# Patient Record
Sex: FEMALE | Race: BLACK OR AFRICAN AMERICAN | Employment: UNEMPLOYED | URBAN - METROPOLITAN AREA
[De-identification: names, ages, dates, MRNs, and addresses within clinical notes are randomized per-mention and may not be internally consistent; named-entity substitution may affect disease eponyms.]

---

## 2019-06-11 PROBLEM — N76.0 BV (BACTERIAL VAGINOSIS): Status: ACTIVE | Noted: 2019-06-11

## 2019-06-11 PROBLEM — B96.89 BV (BACTERIAL VAGINOSIS): Status: ACTIVE | Noted: 2019-06-11

## 2019-06-11 PROBLEM — E10.65 HYPERGLYCEMIA DUE TO TYPE 1 DIABETES MELLITUS (HCC): Status: ACTIVE | Noted: 2019-06-11

## 2019-06-11 PROBLEM — N94.6 SEVERE DYSMENORRHEA: Status: ACTIVE | Noted: 2019-06-11

## 2019-09-18 VITALS — BODY MASS INDEX: 20.49 KG/M2 | WEIGHT: 120 LBS | HEIGHT: 64 IN

## 2019-09-18 RX ORDER — INSULIN GLARGINE 100 [IU]/ML
25 INJECTION, SOLUTION SUBCUTANEOUS
COMMUNITY
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
## 2019-09-19 ENCOUNTER — HOSPITAL ENCOUNTER (OUTPATIENT)
Dept: SURGERY | Age: 39
Discharge: HOME | End: 2019-09-19

## 2019-09-20 ENCOUNTER — HOSPITAL ENCOUNTER (OUTPATIENT)
Dept: SURGERY | Age: 39
Discharge: HOME | End: 2019-09-20
Payer: COMMERCIAL

## 2019-09-20 LAB
ATRIAL RATE: 86 BPM
CALCULATED P AXIS, ECG09: 34 DEGREES
CALCULATED R AXIS, ECG10: 32 DEGREES
CALCULATED T AXIS, ECG11: 36 DEGREES
DIAGNOSIS, 93000: NORMAL
HGB BLD-MCNC: 12.6 G/DL (ref 11.7–15.4)
P-R INTERVAL, ECG05: 120 MS
Q-T INTERVAL, ECG07: 366 MS
QRS DURATION, ECG06: 78 MS
QTC CALCULATION (BEZET), ECG08: 437 MS
VENTRICULAR RATE, ECG03: 86 BPM

## 2019-09-20 PROCEDURE — 85018 HEMOGLOBIN: CPT

## 2019-09-20 PROCEDURE — 93005 ELECTROCARDIOGRAM TRACING: CPT | Performed by: ANESTHESIOLOGY

## 2019-09-20 NOTE — PERIOP NOTES
The below lab results are within anesthesia limits, no further action is required.      Recent Results (from the past 12 hour(s))   EKG, 12 LEAD, INITIAL    Collection Time: 09/20/19  8:42 AM   Result Value Ref Range    Ventricular Rate 86 BPM    Atrial Rate 86 BPM    P-R Interval 120 ms    QRS Duration 78 ms    Q-T Interval 366 ms    QTC Calculation (Bezet) 437 ms    Calculated P Axis 34 degrees    Calculated R Axis 32 degrees    Calculated T Axis 36 degrees    Diagnosis       Normal sinus rhythm  Normal ECG  No previous ECGs available     HEMOGLOBIN    Collection Time: 09/20/19  9:36 AM   Result Value Ref Range    HGB 12.6 11.7 - 15.4 g/dL

## 2019-09-20 NOTE — PERIOP NOTES
Patient arrived to have HGB and EKG completed. EKG completed; results within anesthesia limits. Tracing placed on chart if needed for anesthesia reference. Hgb results pending.

## 2019-09-23 PROBLEM — N80.03 ADENOMYOSIS: Status: ACTIVE | Noted: 2019-09-23

## 2019-09-23 PROBLEM — N92.0 MENORRHAGIA WITH REGULAR CYCLE: Status: ACTIVE | Noted: 2019-09-23

## 2019-09-23 PROBLEM — D25.1 INTRAMURAL LEIOMYOMA OF UTERUS: Status: ACTIVE | Noted: 2019-09-23

## 2019-09-26 ENCOUNTER — ANESTHESIA EVENT (OUTPATIENT)
Dept: SURGERY | Age: 39
End: 2019-09-26
Payer: COMMERCIAL

## 2019-09-26 ENCOUNTER — ANESTHESIA (OUTPATIENT)
Dept: SURGERY | Age: 39
End: 2019-09-26
Payer: COMMERCIAL

## 2019-09-26 ENCOUNTER — HOSPITAL ENCOUNTER (OUTPATIENT)
Age: 39
Setting detail: OBSERVATION
Discharge: HOME | End: 2019-09-27
Attending: OBSTETRICS & GYNECOLOGY | Admitting: OBSTETRICS & GYNECOLOGY
Payer: COMMERCIAL

## 2019-09-26 DIAGNOSIS — G89.18 POST-OP PAIN: Primary | ICD-10-CM

## 2019-09-26 PROBLEM — E11.9 DIABETES MELLITUS TYPE 2, CONTROLLED (HCC): Status: ACTIVE | Noted: 2019-09-26

## 2019-09-26 PROBLEM — D25.9 UTERINE FIBROID: Status: ACTIVE | Noted: 2019-09-26

## 2019-09-26 PROBLEM — N92.0 MENORRHAGIA: Status: ACTIVE | Noted: 2019-09-26

## 2019-09-26 LAB
ABO + RH BLD: NORMAL
BLOOD GROUP ANTIBODIES SERPL: NORMAL
ERYTHROCYTE [DISTWIDTH] IN BLOOD BY AUTOMATED COUNT: 15.2 % (ref 11.9–14.6)
EST. AVERAGE GLUCOSE BLD GHB EST-MCNC: 272 MG/DL
GLUCOSE BLD STRIP.AUTO-MCNC: 137 MG/DL (ref 65–100)
GLUCOSE BLD STRIP.AUTO-MCNC: 210 MG/DL (ref 65–100)
GLUCOSE BLD STRIP.AUTO-MCNC: 225 MG/DL (ref 65–100)
GLUCOSE BLD STRIP.AUTO-MCNC: 260 MG/DL (ref 65–100)
GLUCOSE BLD STRIP.AUTO-MCNC: 290 MG/DL (ref 65–100)
GLUCOSE BLD STRIP.AUTO-MCNC: 327 MG/DL (ref 65–100)
GLUCOSE BLD STRIP.AUTO-MCNC: 72 MG/DL (ref 65–100)
HBA1C MFR BLD: 11.1 %
HCG UR QL: NEGATIVE
HCT VFR BLD AUTO: 39.4 % (ref 35.8–46.3)
HGB BLD-MCNC: 12.8 G/DL (ref 11.7–15.4)
MCH RBC QN AUTO: 27.9 PG (ref 26.1–32.9)
MCHC RBC AUTO-ENTMCNC: 32.5 G/DL (ref 31.4–35)
MCV RBC AUTO: 86 FL (ref 79.6–97.8)
NRBC # BLD: 0 K/UL (ref 0–0.2)
PLATELET # BLD AUTO: 219 K/UL (ref 150–450)
PMV BLD AUTO: 10.4 FL (ref 9.4–12.3)
RBC # BLD AUTO: 4.58 M/UL (ref 4.05–5.2)
SPECIMEN EXP DATE BLD: NORMAL
WBC # BLD AUTO: 5.9 K/UL (ref 4.3–11.1)

## 2019-09-26 PROCEDURE — 83036 HEMOGLOBIN GLYCOSYLATED A1C: CPT

## 2019-09-26 PROCEDURE — 74011250636 HC RX REV CODE- 250/636

## 2019-09-26 PROCEDURE — 74011636637 HC RX REV CODE- 636/637: Performed by: ANESTHESIOLOGY

## 2019-09-26 PROCEDURE — 77030034849: Performed by: OBSTETRICS & GYNECOLOGY

## 2019-09-26 PROCEDURE — 77030003029 HC SUT VCRL J&J -B: Performed by: OBSTETRICS & GYNECOLOGY

## 2019-09-26 PROCEDURE — 77030002888 HC SUT CHRMC J&J -A: Performed by: OBSTETRICS & GYNECOLOGY

## 2019-09-26 PROCEDURE — 77030022703 HC LIGASURE  BLNT LAPSCP SEAL COVD -E: Performed by: OBSTETRICS & GYNECOLOGY

## 2019-09-26 PROCEDURE — 74011250637 HC RX REV CODE- 250/637: Performed by: OBSTETRICS & GYNECOLOGY

## 2019-09-26 PROCEDURE — 76060000035 HC ANESTHESIA 2 TO 2.5 HR: Performed by: OBSTETRICS & GYNECOLOGY

## 2019-09-26 PROCEDURE — 77030039425 HC BLD LARYNG TRULITE DISP TELE -A: Performed by: ANESTHESIOLOGY

## 2019-09-26 PROCEDURE — 77030031139 HC SUT VCRL2 J&J -A: Performed by: OBSTETRICS & GYNECOLOGY

## 2019-09-26 PROCEDURE — 99218 HC RM OBSERVATION: CPT

## 2019-09-26 PROCEDURE — 85027 COMPLETE CBC AUTOMATED: CPT

## 2019-09-26 PROCEDURE — 74011250637 HC RX REV CODE- 250/637: Performed by: ANESTHESIOLOGY

## 2019-09-26 PROCEDURE — 77030008522 HC TBNG INSUF LAPRO STRY -B: Performed by: OBSTETRICS & GYNECOLOGY

## 2019-09-26 PROCEDURE — 74011250636 HC RX REV CODE- 250/636: Performed by: ANESTHESIOLOGY

## 2019-09-26 PROCEDURE — 74011250636 HC RX REV CODE- 250/636: Performed by: OBSTETRICS & GYNECOLOGY

## 2019-09-26 PROCEDURE — 77030019895 HC PCKNG STRP IODO -A: Performed by: OBSTETRICS & GYNECOLOGY

## 2019-09-26 PROCEDURE — 77030012770 HC TRCR OPT FX AMR -B: Performed by: OBSTETRICS & GYNECOLOGY

## 2019-09-26 PROCEDURE — 76210000006 HC OR PH I REC 0.5 TO 1 HR: Performed by: OBSTETRICS & GYNECOLOGY

## 2019-09-26 PROCEDURE — 82962 GLUCOSE BLOOD TEST: CPT

## 2019-09-26 PROCEDURE — 77030040361 HC SLV COMPR DVT MDII -B: Performed by: OBSTETRICS & GYNECOLOGY

## 2019-09-26 PROCEDURE — 86900 BLOOD TYPING SEROLOGIC ABO: CPT

## 2019-09-26 PROCEDURE — 77030019940 HC BLNKT HYPOTHRM STRY -B: Performed by: ANESTHESIOLOGY

## 2019-09-26 PROCEDURE — 81025 URINE PREGNANCY TEST: CPT

## 2019-09-26 PROCEDURE — 74011000258 HC RX REV CODE- 258: Performed by: OBSTETRICS & GYNECOLOGY

## 2019-09-26 PROCEDURE — 74011636637 HC RX REV CODE- 636/637: Performed by: INTERNAL MEDICINE

## 2019-09-26 PROCEDURE — 77030008756 HC TU IRR SUC STRY -B: Performed by: OBSTETRICS & GYNECOLOGY

## 2019-09-26 PROCEDURE — 77030018836 HC SOL IRR NACL ICUM -A: Performed by: OBSTETRICS & GYNECOLOGY

## 2019-09-26 PROCEDURE — 77030039266 HC ADH SKN EXOFIN S2SG -A: Performed by: OBSTETRICS & GYNECOLOGY

## 2019-09-26 PROCEDURE — 77030037088 HC TUBE ENDOTRACH ORAL NSL COVD-A: Performed by: ANESTHESIOLOGY

## 2019-09-26 PROCEDURE — 77030008606 HC TRCR ENDOSC KII AMR -B: Performed by: OBSTETRICS & GYNECOLOGY

## 2019-09-26 PROCEDURE — 77030020829: Performed by: OBSTETRICS & GYNECOLOGY

## 2019-09-26 PROCEDURE — 74011000250 HC RX REV CODE- 250

## 2019-09-26 PROCEDURE — 76010000171 HC OR TIME 2 TO 2.5 HR INTENSV-TIER 1: Performed by: OBSTETRICS & GYNECOLOGY

## 2019-09-26 PROCEDURE — 77030003578 HC NDL INSUF VERES AMR -B: Performed by: OBSTETRICS & GYNECOLOGY

## 2019-09-26 PROCEDURE — 88307 TISSUE EXAM BY PATHOLOGIST: CPT

## 2019-09-26 RX ORDER — FLUMAZENIL 0.1 MG/ML
0.2 INJECTION INTRAVENOUS AS NEEDED
Status: DISCONTINUED | OUTPATIENT
Start: 2019-09-26 | End: 2019-09-26 | Stop reason: HOSPADM

## 2019-09-26 RX ORDER — SODIUM CHLORIDE, SODIUM LACTATE, POTASSIUM CHLORIDE, CALCIUM CHLORIDE 600; 310; 30; 20 MG/100ML; MG/100ML; MG/100ML; MG/100ML
75 INJECTION, SOLUTION INTRAVENOUS CONTINUOUS
Status: DISCONTINUED | OUTPATIENT
Start: 2019-09-26 | End: 2019-09-26 | Stop reason: HOSPADM

## 2019-09-26 RX ORDER — OXYCODONE AND ACETAMINOPHEN 7.5; 325 MG/1; MG/1
1 TABLET ORAL
Status: DISCONTINUED | OUTPATIENT
Start: 2019-09-26 | End: 2019-09-27 | Stop reason: HOSPADM

## 2019-09-26 RX ORDER — SODIUM CHLORIDE 0.9 % (FLUSH) 0.9 %
5-40 SYRINGE (ML) INJECTION EVERY 8 HOURS
Status: DISCONTINUED | OUTPATIENT
Start: 2019-09-26 | End: 2019-09-27 | Stop reason: HOSPADM

## 2019-09-26 RX ORDER — NEOSTIGMINE METHYLSULFATE 1 MG/ML
INJECTION, SOLUTION INTRAVENOUS AS NEEDED
Status: DISCONTINUED | OUTPATIENT
Start: 2019-09-26 | End: 2019-09-26 | Stop reason: HOSPADM

## 2019-09-26 RX ORDER — INSULIN GLARGINE 100 [IU]/ML
25 INJECTION, SOLUTION SUBCUTANEOUS
Status: DISCONTINUED | OUTPATIENT
Start: 2019-09-26 | End: 2019-09-27 | Stop reason: HOSPADM

## 2019-09-26 RX ORDER — HYDROMORPHONE HYDROCHLORIDE 2 MG/ML
0.5 INJECTION, SOLUTION INTRAMUSCULAR; INTRAVENOUS; SUBCUTANEOUS
Status: COMPLETED | OUTPATIENT
Start: 2019-09-26 | End: 2019-09-26

## 2019-09-26 RX ORDER — FENTANYL CITRATE 50 UG/ML
INJECTION, SOLUTION INTRAMUSCULAR; INTRAVENOUS AS NEEDED
Status: DISCONTINUED | OUTPATIENT
Start: 2019-09-26 | End: 2019-09-26 | Stop reason: HOSPADM

## 2019-09-26 RX ORDER — ROCURONIUM BROMIDE 10 MG/ML
INJECTION, SOLUTION INTRAVENOUS AS NEEDED
Status: DISCONTINUED | OUTPATIENT
Start: 2019-09-26 | End: 2019-09-26 | Stop reason: HOSPADM

## 2019-09-26 RX ORDER — SODIUM CHLORIDE 0.9 % (FLUSH) 0.9 %
5-40 SYRINGE (ML) INJECTION AS NEEDED
Status: DISCONTINUED | OUTPATIENT
Start: 2019-09-26 | End: 2019-09-26 | Stop reason: HOSPADM

## 2019-09-26 RX ORDER — INSULIN LISPRO 100 [IU]/ML
INJECTION, SOLUTION INTRAVENOUS; SUBCUTANEOUS
Status: DISCONTINUED | OUTPATIENT
Start: 2019-09-26 | End: 2019-09-27 | Stop reason: HOSPADM

## 2019-09-26 RX ORDER — IBUPROFEN 800 MG/1
800 TABLET ORAL EVERY 8 HOURS
Status: DISCONTINUED | OUTPATIENT
Start: 2019-09-26 | End: 2019-09-27 | Stop reason: HOSPADM

## 2019-09-26 RX ORDER — NALOXONE HYDROCHLORIDE 0.4 MG/ML
0.1 INJECTION, SOLUTION INTRAMUSCULAR; INTRAVENOUS; SUBCUTANEOUS
Status: DISCONTINUED | OUTPATIENT
Start: 2019-09-26 | End: 2019-09-26 | Stop reason: HOSPADM

## 2019-09-26 RX ORDER — FACIAL-BODY WIPES
10 EACH TOPICAL DAILY PRN
Status: DISCONTINUED | OUTPATIENT
Start: 2019-09-26 | End: 2019-09-27 | Stop reason: HOSPADM

## 2019-09-26 RX ORDER — LIDOCAINE HYDROCHLORIDE 20 MG/ML
INJECTION, SOLUTION EPIDURAL; INFILTRATION; INTRACAUDAL; PERINEURAL AS NEEDED
Status: DISCONTINUED | OUTPATIENT
Start: 2019-09-26 | End: 2019-09-26 | Stop reason: HOSPADM

## 2019-09-26 RX ORDER — OXYCODONE HYDROCHLORIDE 5 MG/1
5 TABLET ORAL
Status: DISCONTINUED | OUTPATIENT
Start: 2019-09-26 | End: 2019-09-26 | Stop reason: HOSPADM

## 2019-09-26 RX ORDER — ZOLPIDEM TARTRATE 5 MG/1
5 TABLET ORAL
Status: DISCONTINUED | OUTPATIENT
Start: 2019-09-26 | End: 2019-09-27 | Stop reason: HOSPADM

## 2019-09-26 RX ORDER — PROPOFOL 10 MG/ML
INJECTION, EMULSION INTRAVENOUS AS NEEDED
Status: DISCONTINUED | OUTPATIENT
Start: 2019-09-26 | End: 2019-09-26 | Stop reason: HOSPADM

## 2019-09-26 RX ORDER — SCOLOPAMINE TRANSDERMAL SYSTEM 1 MG/1
1 PATCH, EXTENDED RELEASE TRANSDERMAL ONCE
Status: DISCONTINUED | OUTPATIENT
Start: 2019-09-26 | End: 2019-09-27 | Stop reason: HOSPADM

## 2019-09-26 RX ORDER — GABAPENTIN 300 MG/1
300 CAPSULE ORAL ONCE
Status: COMPLETED | OUTPATIENT
Start: 2019-09-26 | End: 2019-09-26

## 2019-09-26 RX ORDER — PHENYLEPHRINE HYDROCHLORIDE 10 MG/ML
INJECTION INTRAVENOUS AS NEEDED
Status: DISCONTINUED | OUTPATIENT
Start: 2019-09-26 | End: 2019-09-26 | Stop reason: HOSPADM

## 2019-09-26 RX ORDER — DIPHENHYDRAMINE HYDROCHLORIDE 50 MG/ML
12.5 INJECTION, SOLUTION INTRAMUSCULAR; INTRAVENOUS
Status: DISCONTINUED | OUTPATIENT
Start: 2019-09-26 | End: 2019-09-26 | Stop reason: HOSPADM

## 2019-09-26 RX ORDER — NALOXONE HYDROCHLORIDE 0.4 MG/ML
0.4 INJECTION, SOLUTION INTRAMUSCULAR; INTRAVENOUS; SUBCUTANEOUS AS NEEDED
Status: DISCONTINUED | OUTPATIENT
Start: 2019-09-26 | End: 2019-09-27 | Stop reason: HOSPADM

## 2019-09-26 RX ORDER — SODIUM CHLORIDE 0.9 % (FLUSH) 0.9 %
5-40 SYRINGE (ML) INJECTION AS NEEDED
Status: DISCONTINUED | OUTPATIENT
Start: 2019-09-26 | End: 2019-09-27 | Stop reason: HOSPADM

## 2019-09-26 RX ORDER — LIDOCAINE HYDROCHLORIDE 10 MG/ML
0.1 INJECTION INFILTRATION; PERINEURAL AS NEEDED
Status: DISCONTINUED | OUTPATIENT
Start: 2019-09-26 | End: 2019-09-26 | Stop reason: HOSPADM

## 2019-09-26 RX ORDER — MIDAZOLAM HYDROCHLORIDE 1 MG/ML
2 INJECTION, SOLUTION INTRAMUSCULAR; INTRAVENOUS
Status: COMPLETED | OUTPATIENT
Start: 2019-09-26 | End: 2019-09-26

## 2019-09-26 RX ORDER — DIPHENHYDRAMINE HYDROCHLORIDE 50 MG/ML
12.5 INJECTION, SOLUTION INTRAMUSCULAR; INTRAVENOUS
Status: DISCONTINUED | OUTPATIENT
Start: 2019-09-26 | End: 2019-09-27 | Stop reason: HOSPADM

## 2019-09-26 RX ORDER — SODIUM CHLORIDE 0.9 % (FLUSH) 0.9 %
5-40 SYRINGE (ML) INJECTION EVERY 8 HOURS
Status: DISCONTINUED | OUTPATIENT
Start: 2019-09-26 | End: 2019-09-26 | Stop reason: HOSPADM

## 2019-09-26 RX ORDER — GLYCOPYRROLATE 0.2 MG/ML
INJECTION INTRAMUSCULAR; INTRAVENOUS AS NEEDED
Status: DISCONTINUED | OUTPATIENT
Start: 2019-09-26 | End: 2019-09-26 | Stop reason: HOSPADM

## 2019-09-26 RX ORDER — ONDANSETRON 2 MG/ML
INJECTION INTRAMUSCULAR; INTRAVENOUS AS NEEDED
Status: DISCONTINUED | OUTPATIENT
Start: 2019-09-26 | End: 2019-09-26 | Stop reason: HOSPADM

## 2019-09-26 RX ORDER — ONDANSETRON 2 MG/ML
4 INJECTION INTRAMUSCULAR; INTRAVENOUS
Status: DISCONTINUED | OUTPATIENT
Start: 2019-09-26 | End: 2019-09-27 | Stop reason: HOSPADM

## 2019-09-26 RX ORDER — OXYCODONE HYDROCHLORIDE 5 MG/1
10 TABLET ORAL
Status: DISCONTINUED | OUTPATIENT
Start: 2019-09-26 | End: 2019-09-26 | Stop reason: HOSPADM

## 2019-09-26 RX ORDER — HYDROMORPHONE HYDROCHLORIDE 1 MG/ML
1 INJECTION, SOLUTION INTRAMUSCULAR; INTRAVENOUS; SUBCUTANEOUS
Status: DISCONTINUED | OUTPATIENT
Start: 2019-09-26 | End: 2019-09-27 | Stop reason: HOSPADM

## 2019-09-26 RX ORDER — ACETAMINOPHEN 10 MG/ML
1000 INJECTION, SOLUTION INTRAVENOUS
Status: COMPLETED | OUTPATIENT
Start: 2019-09-26 | End: 2019-09-26

## 2019-09-26 RX ORDER — KETOROLAC TROMETHAMINE 30 MG/ML
INJECTION, SOLUTION INTRAMUSCULAR; INTRAVENOUS AS NEEDED
Status: DISCONTINUED | OUTPATIENT
Start: 2019-09-26 | End: 2019-09-26 | Stop reason: HOSPADM

## 2019-09-26 RX ADMIN — FENTANYL CITRATE 50 MCG: 50 INJECTION, SOLUTION INTRAMUSCULAR; INTRAVENOUS at 10:54

## 2019-09-26 RX ADMIN — FENTANYL CITRATE 50 MCG: 50 INJECTION, SOLUTION INTRAMUSCULAR; INTRAVENOUS at 10:46

## 2019-09-26 RX ADMIN — HYDROMORPHONE HYDROCHLORIDE 0.5 MG: 2 INJECTION INTRAMUSCULAR; INTRAVENOUS; SUBCUTANEOUS at 13:18

## 2019-09-26 RX ADMIN — HYDROMORPHONE HYDROCHLORIDE 0.5 MG: 2 INJECTION INTRAMUSCULAR; INTRAVENOUS; SUBCUTANEOUS at 12:56

## 2019-09-26 RX ADMIN — SODIUM CHLORIDE, SODIUM LACTATE, POTASSIUM CHLORIDE, AND CALCIUM CHLORIDE 75 ML/HR: 600; 310; 30; 20 INJECTION, SOLUTION INTRAVENOUS at 10:00

## 2019-09-26 RX ADMIN — ONDANSETRON 4 MG: 2 INJECTION INTRAMUSCULAR; INTRAVENOUS at 11:46

## 2019-09-26 RX ADMIN — LIDOCAINE HYDROCHLORIDE 100 MG: 20 INJECTION, SOLUTION EPIDURAL; INFILTRATION; INTRACAUDAL; PERINEURAL at 10:46

## 2019-09-26 RX ADMIN — SODIUM CHLORIDE, SODIUM LACTATE, POTASSIUM CHLORIDE, AND CALCIUM CHLORIDE: 600; 310; 30; 20 INJECTION, SOLUTION INTRAVENOUS at 12:33

## 2019-09-26 RX ADMIN — SODIUM CHLORIDE, SODIUM LACTATE, POTASSIUM CHLORIDE, AND CALCIUM CHLORIDE: 600; 310; 30; 20 INJECTION, SOLUTION INTRAVENOUS at 11:44

## 2019-09-26 RX ADMIN — NEOSTIGMINE METHYLSULFATE 3 MG: 1 INJECTION, SOLUTION INTRAVENOUS at 12:34

## 2019-09-26 RX ADMIN — INSULIN HUMAN 10 UNITS: 100 INJECTION, SOLUTION PARENTERAL at 10:18

## 2019-09-26 RX ADMIN — Medication 10 ML: at 22:00

## 2019-09-26 RX ADMIN — OXYCODONE HYDROCHLORIDE AND ACETAMINOPHEN 1 TABLET: 7.5; 325 TABLET ORAL at 17:16

## 2019-09-26 RX ADMIN — HYDROMORPHONE HYDROCHLORIDE 0.5 MG: 2 INJECTION INTRAMUSCULAR; INTRAVENOUS; SUBCUTANEOUS at 13:03

## 2019-09-26 RX ADMIN — ROCURONIUM BROMIDE 50 MG: 10 INJECTION, SOLUTION INTRAVENOUS at 10:46

## 2019-09-26 RX ADMIN — SODIUM CHLORIDE, SODIUM LACTATE, POTASSIUM CHLORIDE, AND CALCIUM CHLORIDE: 600; 310; 30; 20 INJECTION, SOLUTION INTRAVENOUS at 10:39

## 2019-09-26 RX ADMIN — GLYCOPYRROLATE 0.4 MG: 0.2 INJECTION INTRAMUSCULAR; INTRAVENOUS at 12:34

## 2019-09-26 RX ADMIN — CEFOXITIN 2 G: 2 INJECTION, POWDER, FOR SOLUTION INTRAVENOUS at 10:44

## 2019-09-26 RX ADMIN — ACETAMINOPHEN 1000 MG: 10 INJECTION, SOLUTION INTRAVENOUS at 13:20

## 2019-09-26 RX ADMIN — INSULIN HUMAN 5 UNITS: 100 INJECTION, SOLUTION PARENTERAL at 13:33

## 2019-09-26 RX ADMIN — INSULIN GLARGINE 25 UNITS: 100 INJECTION, SOLUTION SUBCUTANEOUS at 21:17

## 2019-09-26 RX ADMIN — KETOROLAC TROMETHAMINE 30 MG: 30 INJECTION, SOLUTION INTRAMUSCULAR; INTRAVENOUS at 12:39

## 2019-09-26 RX ADMIN — ROCURONIUM BROMIDE 10 MG: 10 INJECTION, SOLUTION INTRAVENOUS at 11:31

## 2019-09-26 RX ADMIN — MIDAZOLAM 2 MG: 1 INJECTION INTRAMUSCULAR; INTRAVENOUS at 10:36

## 2019-09-26 RX ADMIN — PROPOFOL 200 MG: 10 INJECTION, EMULSION INTRAVENOUS at 10:46

## 2019-09-26 RX ADMIN — OXYCODONE HYDROCHLORIDE AND ACETAMINOPHEN 1 TABLET: 7.5; 325 TABLET ORAL at 21:19

## 2019-09-26 RX ADMIN — GABAPENTIN 300 MG: 300 CAPSULE ORAL at 10:01

## 2019-09-26 RX ADMIN — HYDROMORPHONE HYDROCHLORIDE 0.5 MG: 2 INJECTION INTRAMUSCULAR; INTRAVENOUS; SUBCUTANEOUS at 13:23

## 2019-09-26 RX ADMIN — Medication 10 ML: at 14:49

## 2019-09-26 RX ADMIN — IBUPROFEN 800 MG: 800 TABLET, FILM COATED ORAL at 14:48

## 2019-09-26 RX ADMIN — IBUPROFEN 800 MG: 800 TABLET, FILM COATED ORAL at 21:23

## 2019-09-26 NOTE — PROGRESS NOTES
Problem: Vaginal Hysterectomy: Day of Surgery  Goal: Activity/Safety  Outcome: Progressing Towards Goal  Goal: Consults, if ordered  Outcome: Progressing Towards Goal  Goal: Diagnostic Test/Procedures  Outcome: Progressing Towards Goal  Goal: Nutrition/Diet  Outcome: Progressing Towards Goal  Goal: Discharge Planning  Outcome: Progressing Towards Goal  Goal: Medications  Outcome: Progressing Towards Goal  Goal: Respiratory  Outcome: Progressing Towards Goal  Goal: Treatments/Interventions/Procedures  Outcome: Progressing Towards Goal  Goal: Psychosocial  Outcome: Progressing Towards Goal  Goal: *Hemodynamically stable  Outcome: Progressing Towards Goal  Goal: *Optimal pain control at patient's stated goal  Outcome: Progressing Towards Goal  Goal: *Absence of infection signs and symptoms  Outcome: Progressing Towards Goal     Problem: Falls - Risk of  Goal: *Absence of Falls  Description  Document Kole Rogel Fall Risk and appropriate interventions in the flowsheet.   Outcome: Progressing Towards Goal  Note:   Fall Risk Interventions:            Medication Interventions: Bed/chair exit alarm

## 2019-09-26 NOTE — OP NOTES
OP NOTE        NAME:     Dilip Kramer    DATE OF PROCEDURE:  9/26/2019    PREOPERATIVE DIAGNOSIS:  Uterine leiomyoma, unspecified location [D25.9]  Adenomyosis [N80.0]  Pelvic pain in female [R10.2]  Menorrhagia with regular cycle [N92.0]  Dysmenorrhea [N94.6]    POSTOPERATIVE DIAGNOSIS:  Uterine leiomyoma, unspecified location [D25.9]  Adenomyosis [N80.0]  Pelvic pain in female [R10.2]  Menorrhagia with regular cycle [N92.0]  Dysmenorrhea [N94.6]    PROCEDURE: Laparascopic-assisted vaginal hysterectomy with bilateral salpingectomy. .    SURGEON:  Madai Altamirano MD    ASSISTANT: Dr Haas Poag:  General endotracheal anesthesia. EBL:500 cc  FINDINGS: enlarged uterus with a 5 cm submucosal fibroid,atretic right tube    This case was complex for the following reasons necessitating the assistance of Dr Ann Marie Dougherty. An assistant was necessary due the difficult exposure frequently encountered with LAVH's. It is difficult to maintain pneumoperitoneum and a skilled assistant facilitates final dissection/removal of the right lateral portion of the specimen in a timely and safe fashion while taking steps to maintain pneumoperitoneum. PROCEDURE: The patient was placed on the operating room table in the supine position. Time out was done to confirm the operating procedure, surgeon, patient and site. Once confirmed by the team, procedure was started. The patient underwent general endotracheal anesthesia and was repositioned in the dorsal lithotomy position and prepped and draped in the usual fashion for laparoscopic surgery. Cervix was visualized with the aid of a heavy weight speculum, cervix was grasped with a single tooth maury grubbs cannuala was placed in the cervical os for manipulation during surgery. A subumbilical incision was made. Veres needle was inserted, and the abdomen was filled with 2.5 liters of CO2.  A 5-mm trocar was then placed through the subumbilical incision followed by introduction of the laparoscope. Under direct laparoscopic vision, a 5 mm trocar was placed lateral to the rectus muscle on the patient's right, then a 5-mm trocar was placed lateral to the rectus muscle on the patients left. Evaluation of the pelvis revealed an enlarged uterus. Ligasure device was then placed through the lateral trocar. The tubes were removed with the aide of the Ligasure. The Ligasure pedicles were developed on the left utero-ovarian ligamnet and left round ligament with numerous pedicles developed down through the broad ligament to the lower uterine segment. A pedicle was then developed on the right utero-ovarian ligament and right round ligament with numerous pedicles developed down through the broad ligament to the lower uterine segment. Hemostasis appeared adequate. With the scope in the subumbilical incision, laparoscopic Metzenbaum scissors were used to transversely incise the peritoneal reflexion of the lower uterine segment. Dissectors were used then to develop the bladder flap. The pnemoperitoneum was allowed to escape. The vaginal portion of the case was then undertaken. The cervix was exposed with a weighted speculum. The Lombardi cannula was removed that had been placed previously. Thyroid tenaculum was then placed on the cervix. The cervix was then injected with dilute Chapin-Synephrine solution and circumferentially incised. The cervical mucosal was advanced anteriorly and posteriorly. The cul-de-sac was sharply entered. The curved R&N clamp was used to clamp the uterosacral ligaments bilaterally. These pedicles were incised and then August suture ligated with 0 Vicryl. An additional pedicle was then developed through the broad ligaments, each being clamped, incised, and August suture ligated with 0 Vicryl. The bed of the anterior cervix was then further dissected to develop the bladder flap and enter the peritoneal cavity.  Curved R&N clamps were used to clamp the final pedicle bilaterally to meet with the aforementioned Ligasure pedicles. These pedicles were incised. The specimen was then removed from the operative field. Final pedicles were then ligated first with a free tie of 0 Vicryl followed by fore-and-aft stitch of 0 Vicryl. The pelvis was then re-peritonealized with pursestring closure of 0 Chromic. Hemostasis was then insured and the vaginal cuff was sewn closed with figure-of eight stitches of 0 Vicryl. Pneumoperitoneum was then redeveloped. The laparoscope was used to ensure hemostasis. The pelvis was thoroughly irrigated and suctioned clean. CO2 was allowed to escape, instruments were removed, and the incision was closed with subcuticular 4-0 vicryl. Pt tolerated the procedure well, [place] to PACU in stable condition.

## 2019-09-26 NOTE — PROGRESS NOTES
Admission assessment complete. Pt resting in bed. A&Ox4. Respirations present, even, unlabored. Lung sounds clear to auscultation. 2 L O2 NC. HR regular, S1&S2 auscultated. IV capped and patent. Skin appears to be intact. Cisse draining without difficultly. 3 puncture sites c/d/i. Bilateral SCDs in place. Pt denies pain at this time. Bed in lowest position, call light within reach, side rails x3.

## 2019-09-26 NOTE — PROGRESS NOTES
TRANSFER - IN REPORT:    Verbal report received from [facility] on [employer]  being received from PACU for routine post - op      Report consisted of patients Situation, Background, Assessment and   Recommendations(SBAR). Information from the following report(s) SBAR, Kardex, OR Summary, Intake/Output, Accordion and Recent Results was reviewed with the receiving nurse. Opportunity for questions and clarification was provided.

## 2019-09-26 NOTE — PERIOP NOTES
TRANSFER - OUT REPORT:    Verbal report given to Be Harris RN on Jake Otoole  being transferred to [facility] for routine post - op       Report consisted of patients Situation, Background, Assessment and   Recommendations(SBAR). Information from the following report(s) SBAR, Procedure Summary, Intake/Output and MAR was reviewed with the receiving nurse. Lines:   Peripheral IV 09/26/19 Right Hand (Active)   Site Assessment Clean, dry, & intact 9/26/2019 10:23 AM   Phlebitis Assessment 0 9/26/2019 10:23 AM   Infiltration Assessment 0 9/26/2019 10:23 AM   Dressing Status Clean, dry, & intact 9/26/2019 10:23 AM   Dressing Type Transparent 9/26/2019 10:23 AM   Hub Color/Line Status Green 9/26/2019 10:23 AM        Opportunity for questions and clarification was provided.       Patient transported with:   O2 @ 3 liters

## 2019-09-26 NOTE — PROGRESS NOTES
09/26/19 1425   Incentive Spirometry Treatment   Level of Service Initial   Predicted Volume (ml) 1500 ml   Actual Volume (ml) 1500 ml   % Predicted Volume (ml) 1   Treatment Tolerance Well

## 2019-09-26 NOTE — ANESTHESIA PREPROCEDURE EVALUATION
Relevant Problems   No relevant active problems       Anesthetic History   No history of anesthetic complications            Review of Systems / Medical History  Patient summary reviewed and pertinent labs reviewed    Pulmonary  Within defined limits                 Neuro/Psych   Within defined limits           Cardiovascular  Within defined limits                Exercise tolerance: >4 METS     GI/Hepatic/Renal  Within defined limits              Endo/Other    Diabetes: poorly controlled, type 2         Other Findings            Physical Exam    Airway  Mallampati: II  TM Distance: 4 - 6 cm  Neck ROM: normal range of motion   Mouth opening: Normal     Cardiovascular  Regular rate and rhythm,  S1 and S2 normal,  no murmur, click, rub, or gallop             Dental         Pulmonary  Breath sounds clear to auscultation               Abdominal         Other Findings            Anesthetic Plan    ASA: 3  Anesthesia type: general          Induction: Intravenous  Anesthetic plan and risks discussed with: Patient and Spouse

## 2019-09-26 NOTE — ANESTHESIA POSTPROCEDURE EVALUATION
Procedure(s): HYSTERECTOMY VAGINAL ASSISTED LAPAROSCOPIC (LAVH) BILATERAL SALPINGECTOMY. general    Anesthesia Post Evaluation      Multimodal analgesia: multimodal analgesia used between 6 hours prior to anesthesia start to PACU discharge  Patient location during evaluation: bedside  Patient participation: complete - patient participated  Level of consciousness: responsive to verbal stimuli  Pain management: adequate  Airway patency: patent  Anesthetic complications: no  Cardiovascular status: hemodynamically stable  Respiratory status: spontaneous ventilation  Hydration status: stable  Comments: Will treat Glucose and she will continue to be monitored overnight on floor.         Vitals Value Taken Time   /67 9/26/2019  1:03 PM   Temp 36.6 °C (97.9 °F) 9/26/2019 12:48 PM   Pulse 61 9/26/2019  1:03 PM   Resp 16 9/26/2019  1:03 PM   SpO2 92 % 9/26/2019  1:03 PM

## 2019-09-26 NOTE — CONSULTS
Hospitalist H&P/Consult Note     Admit Date:  2019  9:18 AM   Name:  Hari Hess   Age:  41 y.o.  :  1980   MRN:  [id number]   PCP:  Guanaco Mcconnell MD  Treatment Team: Attending Provider: Guanaco Mcconnell MD; Utilization Review: Dick Justice; Consulting Provider: Geraldine Cueto MD    HPI:   41years old F with PMH of type 2 DM presented to the hospital for Laparascopic hysterectomy with bilateral salpingectomy. Hospitalist consulted for DM management. Patient reported her DM is \" so so\" controlled at home with basaglar 25 units, metformin and glipizide. Patient reported reason for surgery was heavy and painful menstrual periods. Patient denies any other complaints. 10 systems reviewed and negative except as noted in HPI. Past Medical History:   Diagnosis Date    Depression     History     Diabetes (HCC)     Type 2, insulin and oral meds, average fasting 125, Last A1C- unsure, denies hypoglycemia    Urine frequency       Past Surgical History:   Procedure Laterality Date    HX  SECTION      HX TUBAL LIGATION        Prior to Admission Medications   Prescriptions Last Dose Informant Patient Reported? Taking? Blood-Glucose Meter (ACCU-CHEK HUMBERTO PLUS METER) misc   No No   Sig: Check blood glucose as directed. fluconazole (DIFLUCAN) 150 mg tablet Not Taking at Unknown time  No No   Sig: Take 1 tablet by mouth and repeat in 48 hours   glipiZIDE SR (GLUCOTROL XL) 10 mg CR tablet 2019 at Unknown time  No Yes   Sig: Take 1 Tab by mouth daily. insulin glargine (BASAGLAR KWIKPEN U-100 INSULIN) 100 unit/mL (3 mL) inpn 2019 at Unknown time  Yes Yes   Si Units by SubCUTAneous route nightly. metFORMIN (GLUCOPHAGE) 1,000 mg tablet 2019 at Unknown time  Yes Yes   Sig: Take 1,000 mg by mouth two (2) times a day. naproxen sodium (ANAPROX) 550 mg tablet 2019  No No   Sig: Take 1 Tab by mouth two (2) times daily (with meals).    Patient taking differently: Take 550 mg by mouth two (2) times daily as needed. Facility-Administered Medications: None     No Known Allergies   Social History     Tobacco Use    Smoking status: Current Every Day Smoker     Packs/day: 0.50     Years: 20.00     Pack years: 10.00    Smokeless tobacco: Never Used   Substance Use Topics    Alcohol use: Yes     Alcohol/week: 1.0 standard drinks     Types: 1 Cans of beer per week      Family History   Problem Relation Age of Onset    Diabetes Mother     Diabetes Brother     Diabetes Maternal Grandmother     No Known Problems Father       Immunization History   Administered Date(s) Administered    Pneumococcal Polysaccharide (PPSV-23) 07/17/2018       Objective:     Patient Vitals for the past 24 hrs:   Temp Pulse Resp BP SpO2   09/26/19 1431 95.8 °F (35.4 °C) 74 15 119/84 100 %   09/26/19 1348  62 16 124/71 99 %   09/26/19 1333  82 16 130/61 98 %   09/26/19 1318  78 16 128/68 100 %   09/26/19 1303  61 16 128/67 92 %   09/26/19 1258  65 16 118/61 95 %   09/26/19 1253  82 18 139/82 94 %   09/26/19 1248 97.9 °F (36.6 °C) 95 22 149/67 96 %   09/26/19 0929 98.8 °F (37.1 °C) (!) 109  127/79 97 %     Oxygen Therapy  O2 Sat (%): 100 % (09/26/19 1431)  O2 Device: Nasal cannula (09/26/19 1425)  O2 Flow Rate (L/min): 2 l/min (09/26/19 1425)    Intake/Output Summary (Last 24 hours) at 9/26/2019 1546  Last data filed at 9/26/2019 1409  Gross per 24 hour   Intake 2000 ml   Output 750 ml   Net 1250 ml       Physical Exam:  General:    Well nourished. Alert. CV:   RRR. No murmur, rub, or gallop. Lungs:  CTAB. No wheezing, rhonchi, or rales. Abdomen: Soft, nontender, nondistended. Bowel sounds normal. Clean wounds. Extremities: Warm and dry. No edema. admission.   Data Review:   Recent Results (from the past 24 hour(s))   CBC W/O DIFF    Collection Time: 09/26/19  9:48 AM   Result Value Ref Range    WBC 5.9 4.3 - 11.1 K/uL    RBC 4.58 4.05 - 5.2 M/uL    HGB 12.8 11.7 - 15.4 g/dL    HCT 39.4 35.8 - 46.3 %    MCV 86.0 79.6 - 97.8 FL    MCH 27.9 26.1 - 32.9 PG    MCHC 32.5 31.4 - 35.0 g/dL    RDW 15.2 (H) 11.9 - 14.6 %    PLATELET 849 651 - 179 K/uL    MPV 10.4 9.4 - 12.3 FL    ABSOLUTE NRBC 0.00 0.0 - 0.2 K/uL   GLUCOSE, POC    Collection Time: 09/26/19  9:52 AM   Result Value Ref Range    Glucose (POC) 327 (H) 65 - 100 mg/dL   HCG URINE, QL. - POC    Collection Time: 09/26/19  9:53 AM   Result Value Ref Range    Pregnancy test,urine (POC) NEGATIVE  NEG     TYPE & SCREEN    Collection Time: 09/26/19  9:54 AM   Result Value Ref Range    Crossmatch Expiration 09/29/2019     ABO/Rh(D) AB POSITIVE     Antibody screen NEG    GLUCOSE, POC    Collection Time: 09/26/19 10:05 AM   Result Value Ref Range    Glucose (POC) 290 (H) 65 - 100 mg/dL   GLUCOSE, POC    Collection Time: 09/26/19 10:35 AM   Result Value Ref Range    Glucose (POC) 260 (H) 65 - 100 mg/dL   GLUCOSE, POC    Collection Time: 09/26/19  1:11 PM   Result Value Ref Range    Glucose (POC) 225 (H) 65 - 100 mg/dL   GLUCOSE, POC    Collection Time: 09/26/19  2:23 PM   Result Value Ref Range    Glucose (POC) 210 (H) 65 - 100 mg/dL       Imaging Studies:  CXR Results  (Last 48 hours)    None        CT Results  (Last 48 hours)    None          Assessment and Plan:     Hospital Problems as of 9/26/2019 Never Reviewed          Codes Class Noted - Resolved POA    Menorrhagia ICD-10-CM: N92.0  ICD-9-CM: 626.2  9/26/2019 - Present Unknown        Uterine fibroid ICD-10-CM: D25.9  ICD-9-CM: 218.9  9/26/2019 - Present Unknown        Diabetes mellitus type 2, [facility] ([facility] ICD-10-CM: E11.9  ICD-9-CM: 250.00  9/26/2019 - Present Unknown        Severe dysmenorrhea ICD-10-CM: N94.6  ICD-9-CM: 625.3  6/11/2019 - Present Unknown              AP:    -s/p hysterectomy with bilateral salpingectomy  Management and DVT ppx per GYN     -DM  CBGs on low 200s likely from stress from surgery  Start Lantus 25 units and ISS  Restart home medications upon discharge  Give half dose Lantus if NPO for surgical procedure  Patient needs follow up with PCP upon discharge for further medications adjustment. We appreciate the opportunity to participate in this patient's care. Will sign off as DM is stable. Please call us if any questions. Signed:   Nisha Rendon MD

## 2019-09-26 NOTE — PROGRESS NOTES
09/26/19 1425   Dual Skin Pressure Injury Assessment   Dual Skin Pressure Injury Assessment WDL  (3 lap sites c/d/i)   Second Care Provider (Based on [address] [place] [address]) CHESTER Miller

## 2019-09-27 VITALS
OXYGEN SATURATION: 98 % | HEART RATE: 78 BPM | DIASTOLIC BLOOD PRESSURE: 72 MMHG | SYSTOLIC BLOOD PRESSURE: 108 MMHG | WEIGHT: 117.2 LBS | BODY MASS INDEX: 20.12 KG/M2 | TEMPERATURE: 98.6 F | RESPIRATION RATE: 16 BRPM

## 2019-09-27 LAB
GLUCOSE BLD STRIP.AUTO-MCNC: 176 MG/DL (ref 65–100)
HCT VFR BLD AUTO: 31 % (ref 35.8–46.3)
HGB BLD-MCNC: 9.9 G/DL (ref 11.7–15.4)

## 2019-09-27 PROCEDURE — 82962 GLUCOSE BLOOD TEST: CPT

## 2019-09-27 PROCEDURE — 74011250637 HC RX REV CODE- 250/637: Performed by: OBSTETRICS & GYNECOLOGY

## 2019-09-27 PROCEDURE — 85018 HEMOGLOBIN: CPT

## 2019-09-27 PROCEDURE — 74011636637 HC RX REV CODE- 636/637: Performed by: INTERNAL MEDICINE

## 2019-09-27 PROCEDURE — 36415 COLL VENOUS BLD VENIPUNCTURE: CPT

## 2019-09-27 PROCEDURE — 99218 HC RM OBSERVATION: CPT

## 2019-09-27 RX ORDER — OXYCODONE AND ACETAMINOPHEN 7.5; 325 MG/1; MG/1
1 TABLET ORAL
Qty: 20 TAB | Refills: 0 | Status: SHIPPED | OUTPATIENT
Start: 2019-09-27 | End: 2019-10-04

## 2019-09-27 RX ADMIN — INSULIN LISPRO 2 UNITS: 100 INJECTION, SOLUTION INTRAVENOUS; SUBCUTANEOUS at 08:05

## 2019-09-27 RX ADMIN — OXYCODONE HYDROCHLORIDE AND ACETAMINOPHEN 1 TABLET: 7.5; 325 TABLET ORAL at 09:59

## 2019-09-27 RX ADMIN — OXYCODONE HYDROCHLORIDE AND ACETAMINOPHEN 1 TABLET: 7.5; 325 TABLET ORAL at 05:22

## 2019-09-27 RX ADMIN — IBUPROFEN 800 MG: 800 TABLET, FILM COATED ORAL at 05:22

## 2019-09-27 RX ADMIN — OXYCODONE HYDROCHLORIDE AND ACETAMINOPHEN 1 TABLET: 7.5; 325 TABLET ORAL at 01:32

## 2019-09-27 NOTE — DISCHARGE INSTRUCTIONS
DISCHARGE SUMMARY from Nurse    PATIENT INSTRUCTIONS:    After general anesthesia or intravenous sedation, for 24 hours or while taking prescription Narcotics:  · Limit your activities  · Do not drive and operate hazardous machinery  · Do not make important personal or business decisions  · Do  not drink alcoholic beverages  · If you have not urinated within 8 hours after discharge, please contact your surgeon on call. Report the following to your surgeon:  · Excessive pain, swelling, redness or odor of or around the surgical area  · Temperature over 100.5  · Nausea and vomiting lasting longer than 4 hours or if unable to take medications  · Any signs of decreased circulation or nerve impairment to extremity: change in color, persistent  numbness, tingling, coldness or increase pain  · Any questions    What to do at Home:  Recommended activity: Activity as tolerated, take short frequent walks several times a day to prevent blood clots, no strenuous exercise or heavy lifting; no sex, douching or tampons; no tub baths or swimming, may shower, keep incisions clean and dry. Regular diet, Colace or Miralax OTC for constipation. If you experience any of the following symptoms severe abdominal pain, nausea/vomiting lasting longer than 4 hours, fever (>101) or other s/s of wound infection, heavy vaginal bleeding (>1 loretta pad/hour), please follow up with your surgeon. *  Please give a list of your current medications to your Primary Care Provider. *  Please update this list whenever your medications are discontinued, doses are      changed, or new medications (including over-the-counter products) are added. *  Please carry medication information at all times in case of emergency situations.     These are general instructions for a healthy lifestyle:    No smoking/ No tobacco products/ Avoid exposure to second hand smoke  Surgeon General's Warning:  Quitting smoking now greatly reduces serious risk to your health. Obesity, smoking, and sedentary lifestyle greatly increases your risk for illness    A healthy diet, regular physical exercise & weight monitoring are important for maintaining a healthy lifestyle    You may be retaining fluid if you have a history of heart failure or if you experience any of the following symptoms:  Weight gain of 3 pounds or more overnight or 5 pounds in a week, increased swelling in our hands or feet or shortness of breath while lying flat in bed. Please call your doctor as soon as you notice any of these symptoms; do not wait until your next office visit. Recognize signs and symptoms of STROKE:    F-face looks uneven    A-arms unable to move or move unevenly    S-speech slurred or non-existent    T-time-call 911 as soon as signs and symptoms begin-DO NOT go       Back to bed or wait to see if you get better-TIME IS BRAIN. Warning Signs of HEART ATTACK     Call 911 if you have these symptoms:   Chest discomfort. Most heart attacks involve discomfort in the center of the chest that lasts more than a few minutes, or that goes away and comes back. It can feel like uncomfortable pressure, squeezing, fullness, or pain.  Discomfort in other areas of the upper body. Symptoms can include pain or discomfort in one or both arms, the back, neck, jaw, or stomach.  Shortness of breath with or without chest discomfort.  Other signs may include breaking out in a cold sweat, nausea, or lightheadedness. Don't wait more than five minutes to call 911 - MINUTES MATTER! Fast action can save your life. Calling 911 is almost always the fastest way to get lifesaving treatment. Emergency Medical Services staff can begin treatment when they arrive -- up to an hour sooner than if someone gets to the hospital by car. The discharge information has been reviewed with the patient. The patient verbalized understanding.   Discharge medications reviewed with the patient and appropriate educational materials and side effects teaching were provided. ___________________________________________________________________________________________________________________________________    Patient Education        Laparoscopic Hysterectomy: What to Expect at Home  Your Recovery    A hysterectomy is surgery to take out the uterus. In some cases, the ovaries and fallopian tubes also are taken out at the same time. You can expect to feel better and stronger each day, but you may need pain medicine for a week or two. You may get tired easily or have less energy than usual. The tiredness may last for several weeks after surgery. You will probably notice that your belly is swollen and puffy. This is common. The swelling will take several weeks to go down. You may take about 4 to 6 weeks to fully recover. It is important to avoid lifting while you are recovering so that you can heal.  This care sheet gives you a general idea about how long it will take for you to recover. But each person recovers at a different pace. Follow the steps below to get better as quickly as possible. How can you care for yourself at home? Activity    · Rest when you feel tired.     · Be active. Walking is a good choice.     · Allow the area to heal. Don't move quickly or lift anything heavy until you are feeling better.     · You may shower 24 to 48 hours after surgery, if your doctor okays it. Pat the incision dry. Do not take a bath for the first 2 weeks, or until your doctor tells you it is okay.     · Ask your doctor when it is okay for you to have sex. Diet    · You can eat your normal diet. If your stomach is upset, try bland, low-fat foods like plain rice, broiled chicken, toast, and yogurt.     · If your bowel movements are not regular right after surgery, try to avoid constipation and straining. Drink plenty of water. Your doctor may suggest fiber, a stool softener, or a mild laxative.    Medicines    · Your doctor will tell you if and when you can restart your medicines. He or she will also give you instructions about taking any new medicines.     · If you take blood thinners, such as warfarin (Coumadin), clopidogrel (Plavix), or aspirin, be sure to talk to your doctor. He or she will tell you if and when to start taking those medicines again. Make sure that you understand exactly what your doctor wants you to do.     · Be safe with medicines. Read and follow all instructions on the label. ? If the doctor gave you a prescription medicine for pain, take it as prescribed. ? If you are not taking a prescription pain medicine, ask your doctor if you can take an over-the-counter medicine.     · If your doctor prescribed antibiotics, take them as directed. Do not stop taking them just because you feel better. You need to take the full course of antibiotics. Incision care    · You may have stitches over the cuts (incisions) the doctor made in your belly.     · If you have strips of tape on the cut (incision) the doctor made, leave the tape on for a week or until it falls off.     · Wash the area daily with warm, soapy water, and pat it dry. Don't use hydrogen peroxide or alcohol. They can slow healing.     · You may cover the area with a gauze bandage if it oozes fluid or rubs against clothing.     · Change the bandage every day. Other instructions    · You may have some light vaginal bleeding. Wear sanitary pads if needed. Do not douche or use tampons.     · Don't have sex until the doctor says it is okay. Follow-up care is a key part of your treatment and safety. Be sure to make and go to all appointments, and call your doctor if you are having problems. It's also a good idea to know your test results and keep a list of the medicines you take. When should you call for help? Call 911 anytime you think you may need emergency care.  For example, call if:    · You passed out (lost consciousness).     · You have chest pain, are short of breath, or cough up blood.    Call your doctor now or seek immediate medical care if:    · You have pain that does not get better after you take pain medicine.     · You cannot pass stools or gas.     · You have vaginal discharge that has increased in amount or smells bad.     · You are sick to your stomach or cannot drink fluids.     · You have loose stitches, or your incision comes open.     · Bright red blood has soaked through the bandage over your incision.     · You have signs of infection, such as:  ? Increased pain, swelling, warmth, or redness. ? Red streaks leading from the incision. ? Pus draining from the incision. ? A fever.     · You have bright red vaginal bleeding that soaks one or more pads in an hour, or you have large clots.     · You have signs of a blood clot in your leg (called a deep vein thrombosis), such as:  ? Pain in your calf, back of the knee, thigh, or groin. ? Redness and swelling in your leg or groin.    Watch closely for changes in your health, and be sure to contact your doctor if you have any problems. Where can you learn more? Go to http://teresa-aby.info/. Enter Q131 in the search box to learn more about \"Laparoscopic Hysterectomy: What to Expect at Home. \"  Current as of: February 19, 2019  Content Version: 12.2  © 4269-7800 [employer]. Care instructions adapted under license by [employer] (which disclaims liability or warranty for this information). If you have questions about a medical condition or this instruction, always ask your healthcare professional. [place] [address] any warranty or liability for your use of this information.

## 2019-09-27 NOTE — PROGRESS NOTES
Gynecology Progress Note    Patient doing well post-op day 1 from Procedure(s): HYSTERECTOMY VAGINAL ASSISTED LAPAROSCOPIC (LAVH) BILATERAL SALPINGECTOMY without significant complaints. Pain controlled on current medication. Voiding without difficulty. Patient is not passing flatus. Vitals:  Blood pressure 108/72, pulse 78, temperature 98.6 °F (37 °C), resp. rate 16, weight 117 lb 3.2 oz (53.2 kg), last menstrual period 2019, SpO2 98 %, not currently breastfeeding. Temp (24hrs), Av.9 °F (36.6 °C), Min:95.8 °F (35.4 °C), Max:98.8 °F (37.1 °C)        Exam:  Patient without distress. Abdomen soft,  nontender. Incision dry and clean without erythema. Lower extremities are negative for swelling, cords, or tenderness. Lab/Data Review:  CBC:   Lab Results   Component Value Date/Time    WBC 5.9 2019 09:48 AM    HGB 9.9 (L) 2019 05:01 AM    HCT 31.0 (L) 2019 05:01 AM     2019 09:48 AM       Assessment and Plan:  Patient appears to be having uncomplicated post Procedure(s): HYSTERECTOMY VAGINAL ASSISTED LAPAROSCOPIC (LAVH) BILATERAL SALPINGECTOMY course. Continue routine post-op care.

## 2019-09-27 NOTE — PROGRESS NOTES
Pt walking around in hallway. Pt stated \"im passing gas now\". No complaints of pain at this time. Encouraged to call for help when needed.

## 2019-09-27 NOTE — DISCHARGE SUMMARY
Antepartum Discharge Summary     Name: Maryjane Pennington MRN: [id number]  SSN: [id number]    YOB: 1980  Age: 41 y.o. Sex: female      Allergies: Patient has no known allergies. Admit Date: 9/26/2019    Discharge Date: 9/27/2019     Admitting Physician: Cyn Tompkins MD     Attending Physician:  Adry Zapata MD     * Admission Diagnoses: Uterine leiomyoma, unspecified location [D25.9]; Adenomyosis [N80.0]; Pelvic pain in female [R10.2]; Menorrhagia with regular cycle [N92.0]; Dysmenorrhea [N94.6]; Menorrhagia [N92.0]; Severe dysmenorrhea [N94.6]; Uterine fibroid [D25.9]    * Discharge Diagnoses:   Hospital Problems as of 9/27/2019 Never Reviewed          Codes Class Noted - Resolved POA    Menorrhagia ICD-10-CM: N92.0  ICD-9-CM: 626.2  9/26/2019 - Present Unknown        Uterine fibroid ICD-10-CM: D25.9  ICD-9-CM: 218.9  9/26/2019 - Present Unknown        Di[facility] mellitus type 2, [facility] ICD-10-CM: E11.9  ICD-9-CM: 250.00  9/26/2019 - Present Unknown        Severe dysmenorrhea ICD-10-CM: N94.6  ICD-9-CM: 625.3  6/11/2019 - Present Unknown             Lab Results   Component Value Date/Time    ABO/Rh(D) AB POSITIVE 09/26/2019 09:54 AM      Immunization History   Administered Date(s) Administered    Pneumococcal Polysaccharide (PPSV-23) 07/17/2018       * Discharge Condition: good    * Procedures:   Procedure(s): HYSTERECTOMY VAGINAL ASSISTED LAPAROSCOPIC (LAVH) BILATERAL SALPINGECTOMY      * Hospital Course:    - normal    * Disposition: Home    Discharge Medications:   Current Discharge Medication List      START taking these medications    Details   oxyCODONE-acetaminophen (PERCOCET 7.5) 7.5-325 mg per tablet Take 1 Tab by mouth every four (4) hours as needed for Pain for up to 7 days. Max Daily Amount: 6 Tabs.   Qty: 20 Tab, Refills: 0    Associated Diagnoses: Post-op pain         CONTINUE these medications which have NOT CHANGED    Details   insulin glargine [facility] U-100 INSULIN) 100 unit/mL (3 mL) inpn 25 Units by SubCUTAneous route nightly. glipiZIDE SR (GLUCOTROL XL) 10 mg CR tablet Take 1 Tab by mouth daily. Qty: 30 Tab, Refills: 11      metFORMIN (GLUCOPHAGE) 1,000 mg tablet Take 1,000 mg by mouth two (2) times a day. fluconazole (DIFLUCAN) 150 mg tablet Take 1 tablet by mouth and repeat in 48 hours  Qty: 2 Tab, Refills: 3      Blood-Glucose Meter (ACCU-CHEK HUMBERTO PLUS METER) misc Check blood glucose as directed. Qty: 1 Each, Refills: 0             * Follow-up Care/Patient Instructions:   Activity: See surgical instructions  Diet: Diabetic Diet  Wound Care: As directed    Follow-up Information     Follow up With Specialties Details Why Contact Info    Maryam Ferguson MD Obstetrics & Gynecology, Gynecology, Obstetrics   [address] [place] [address] [phone]

## 2019-10-14 PROBLEM — N94.6 SEVERE DYSMENORRHEA: Status: RESOLVED | Noted: 2019-06-11 | Resolved: 2019-10-14

## 2019-10-14 PROBLEM — D25.1 INTRAMURAL LEIOMYOMA OF UTERUS: Status: RESOLVED | Noted: 2019-09-23 | Resolved: 2019-10-14

## 2019-10-14 PROBLEM — N80.03 ADENOMYOSIS: Status: RESOLVED | Noted: 2019-09-23 | Resolved: 2019-10-14

## 2019-10-14 PROBLEM — N92.0 MENORRHAGIA: Status: RESOLVED | Noted: 2019-09-26 | Resolved: 2019-10-14

## 2019-10-14 PROBLEM — N92.0 MENORRHAGIA WITH REGULAR CYCLE: Status: RESOLVED | Noted: 2019-09-23 | Resolved: 2019-10-14

## 2019-10-14 PROBLEM — D25.9 UTERINE FIBROID: Status: RESOLVED | Noted: 2019-09-26 | Resolved: 2019-10-14

## 2022-03-18 PROBLEM — N76.0 BV (BACTERIAL VAGINOSIS): Status: ACTIVE | Noted: 2019-06-11

## 2022-03-18 PROBLEM — B96.89 BV (BACTERIAL VAGINOSIS): Status: ACTIVE | Noted: 2019-06-11

## 2022-03-19 PROBLEM — E11.9 DIABETES MELLITUS TYPE 2, CONTROLLED (HCC): Status: ACTIVE | Noted: 2019-09-26

## 2025-07-30 ENCOUNTER — HOSPITAL ENCOUNTER (EMERGENCY)
Age: 45
Discharge: HOME | End: 2025-07-30
Attending: EMERGENCY MEDICINE
Payer: COMMERCIAL

## 2025-07-30 VITALS
OXYGEN SATURATION: 98 % | WEIGHT: 120 LBS | BODY MASS INDEX: 19.99 KG/M2 | HEIGHT: 65 IN | TEMPERATURE: 98.4 F | RESPIRATION RATE: 18 BRPM | SYSTOLIC BLOOD PRESSURE: 132 MMHG | DIASTOLIC BLOOD PRESSURE: 99 MMHG | HEART RATE: 99 BPM

## 2025-07-30 DIAGNOSIS — B96.89 BV (BACTERIAL VAGINOSIS): ICD-10-CM

## 2025-07-30 DIAGNOSIS — A59.01 TRICHOMONAS VAGINITIS: Primary | ICD-10-CM

## 2025-07-30 DIAGNOSIS — A60.04 HERPES SIMPLEX VULVOVAGINITIS: ICD-10-CM

## 2025-07-30 DIAGNOSIS — N76.0 BV (BACTERIAL VAGINOSIS): ICD-10-CM

## 2025-07-30 LAB
BILIRUB UR QL: NEGATIVE
GLUCOSE UR QL STRIP.AUTO: >1000 MG/DL
HCG UR QL: NEGATIVE
KETONES UR-MCNC: 15 MG/DL
LEUKOCYTE ESTERASE UR QL STRIP: NEGATIVE
NITRITE UR QL: NEGATIVE
PH UR: 7 (ref 5–9)
PROT UR QL: NEGATIVE MG/DL
RBC # UR STRIP: ABNORMAL
SERVICE CMNT-IMP: ABNORMAL
SERVICE CMNT-IMP: NORMAL
SP GR UR: 1.02 (ref 1–1.02)
T PALLIDUM AB SER QL IA: NONREACTIVE
UROBILINOGEN UR QL: 0.2 EU/DL (ref 0.2–1)
WET PREP GENITAL: NORMAL

## 2025-07-30 PROCEDURE — 96372 THER/PROPH/DIAG INJ SC/IM: CPT

## 2025-07-30 PROCEDURE — 87255 GENET VIRUS ISOLATE HSV: CPT

## 2025-07-30 PROCEDURE — 81003 URINALYSIS AUTO W/O SCOPE: CPT

## 2025-07-30 PROCEDURE — 87591 N.GONORRHOEAE DNA AMP PROB: CPT

## 2025-07-30 PROCEDURE — 81025 URINE PREGNANCY TEST: CPT

## 2025-07-30 PROCEDURE — 87491 CHLMYD TRACH DNA AMP PROBE: CPT

## 2025-07-30 PROCEDURE — 99283 EMERGENCY DEPT VISIT LOW MDM: CPT

## 2025-07-30 PROCEDURE — 6360000002 HC RX W HCPCS: Performed by: EMERGENCY MEDICINE

## 2025-07-30 PROCEDURE — 6370000000 HC RX 637 (ALT 250 FOR IP): Performed by: EMERGENCY MEDICINE

## 2025-07-30 PROCEDURE — 86780 TREPONEMA PALLIDUM: CPT

## 2025-07-30 PROCEDURE — 87210 SMEAR WET MOUNT SALINE/INK: CPT

## 2025-07-30 RX ORDER — VALACYCLOVIR HYDROCHLORIDE 1 G/1
1000 TABLET, FILM COATED ORAL 2 TIMES DAILY
Qty: 20 TABLET | Refills: 0 | Status: SHIPPED | OUTPATIENT
Start: 2025-07-30 | End: 2025-08-09

## 2025-07-30 RX ORDER — ACYCLOVIR 400 MG/1
200 TABLET ORAL ONCE
Status: COMPLETED | OUTPATIENT
Start: 2025-07-30 | End: 2025-07-30

## 2025-07-30 RX ORDER — METRONIDAZOLE 500 MG/1
500 TABLET ORAL
Status: COMPLETED | OUTPATIENT
Start: 2025-07-30 | End: 2025-07-30

## 2025-07-30 RX ORDER — DOXYCYCLINE 100 MG/1
100 CAPSULE ORAL
Status: COMPLETED | OUTPATIENT
Start: 2025-07-30 | End: 2025-07-30

## 2025-07-30 RX ORDER — DOXYCYCLINE HYCLATE 100 MG
100 TABLET ORAL 2 TIMES DAILY
Qty: 14 TABLET | Refills: 0 | Status: SHIPPED | OUTPATIENT
Start: 2025-07-30 | End: 2025-08-06

## 2025-07-30 RX ADMIN — ACYCLOVIR 200 MG: 400 TABLET ORAL at 20:41

## 2025-07-30 RX ADMIN — DOXYCYCLINE HYCLATE 100 MG: 100 CAPSULE ORAL at 20:40

## 2025-07-30 RX ADMIN — METRONIDAZOLE 500 MG: 500 TABLET ORAL at 21:03

## 2025-07-30 RX ADMIN — LIDOCAINE HYDROCHLORIDE 500 MG: 10 INJECTION, SOLUTION INFILTRATION; PERINEURAL at 20:57

## 2025-07-30 ASSESSMENT — PAIN SCALES - GENERAL: PAINLEVEL_OUTOF10: 8

## 2025-07-30 ASSESSMENT — LIFESTYLE VARIABLES
HOW OFTEN DO YOU HAVE A DRINK CONTAINING ALCOHOL: 4 OR MORE TIMES A WEEK
HOW MANY STANDARD DRINKS CONTAINING ALCOHOL DO YOU HAVE ON A TYPICAL DAY: 1 OR 2

## 2025-07-30 ASSESSMENT — PAIN - FUNCTIONAL ASSESSMENT: PAIN_FUNCTIONAL_ASSESSMENT: 0-10

## 2025-07-30 NOTE — ED PROVIDER NOTES
Emergency Department Provider Note       PCP: No primary care provider on file.   Age: 44 y.o.   Sex: female     DISPOSITION                No diagnosis found.    Medical Decision Making     44-year-old female with STD concerns after unprotected sex, wet prep reveals bacterial vaginosis and trichomonas, also appears to have herpetic lesions which are new.  Will treat with acyclovir to cover for gonorrhea chlamydia and prescribe Flagyl as well.     1 acute, uncomplicated illness or injury.  Prescription drug management performed.  Patient was discharged risks and benefits of hospitalization were considered.  Shared medical decision making was utilized in creating the patients health plan today.    I independently ordered and reviewed each unique test.                     History     HISTORY OF PRESENT ILLNESS  44-year-old female presents with approximately 1.5 weeks of constant burning with urination, not associated with vaginal discharge, but she has vaginal pain and itching. She reports a possible risk for sexually transmitted infection due to a recent unprotected sexual encounter.     She has had prioryeast infections, stating this episode feels different from previous ones. She requests screening for sexually transmitted diseases. Denies frequent urinary tract infections.    PAST MEDICAL HISTORY  - Diabetes mellitus  - History of urinary tract infection  - History of yeast infections          ROS     Review of Systems   Genitourinary:  Positive for genital sores, urgency and vaginal pain. Negative for difficulty urinating, dysuria, frequency and vaginal discharge.   All other systems reviewed and are negative.       Physical Exam     Vitals signs and nursing note reviewed:  Vitals:    07/30/25 1849   BP: (!) 126/94   Pulse: (!) 102   Resp: 16   Temp: 98.2 °F (36.8 °C)   TempSrc: Oral   SpO2: 99%   Weight: 54.4 kg (120 lb)   Height: 1.651 m (5' 5\")      Physical Exam  Vitals and nursing note reviewed.

## 2025-07-31 ENCOUNTER — TELEPHONE (OUTPATIENT)
Dept: EMERGENCY DEPT | Age: 45
End: 2025-07-31

## 2025-07-31 NOTE — TELEPHONE ENCOUNTER
2:05pm: Attempted to contact patient given  noted patient called and had questions on test results. No answer, sent to voicemail.     2:39pm: Spoke with patient and relayed current test results.  G/C/HSV all still pending, relayed to patient she would be called for positive results.  Patient noted that her MyChart is not syncing which is why she called to ascertain if results had been posted.

## 2025-07-31 NOTE — DISCHARGE INSTRUCTIONS
No intercourse for one week,  Use condoms thereafter  Talk to your dr about supression therapy, and therapy for herpes recurrences  Rpr for syphillis will be resulted later tonight,  Gonorrhea and chlamydia tests will be resulted in a week or so,  Use \"MYCHART\" to check your results

## 2025-08-02 LAB
C TRACH RRNA SPEC QL NAA+PROBE: NEGATIVE
N GONORRHOEA RRNA SPEC QL NAA+PROBE: NEGATIVE
SPECIMEN SOURCE: NORMAL

## 2025-08-03 LAB
HSV SPEC CULT: ABNORMAL
SPECIMEN SOURCE: ABNORMAL

## (undated) DEVICE — TROCAR: Brand: KII SHIELDED BLADED ACCESS SYSTEM

## (undated) DEVICE — SOL ANTI-FOG 6ML MEDC -- MEDICHOICE - CONVERT TO 358427

## (undated) DEVICE — BLUNT DISSECTOR: Brand: ENDO PEANUT

## (undated) DEVICE — INSUFFLATION NEEDLE TO ESTABLISH PNEUMOPERITONEUM.: Brand: INSUFFLATION NEEDLE

## (undated) DEVICE — TROCAR: Brand: KII® SLEEVE

## (undated) DEVICE — CONTAINER SPEC HISTOLOGY 900ML POLYPR

## (undated) DEVICE — 2000CC GUARDIAN II: Brand: GUARDIAN

## (undated) DEVICE — SEALER ENDOSCP L37CM NANO COAT BLNT TIP LAP DIV

## (undated) DEVICE — SYRINGE TB 1ML NDL 25GA L0.625IN PLAS SLIP TIP CONVENTIONAL

## (undated) DEVICE — [HIGH FLOW INSUFFLATOR,  DO NOT USE IF PACKAGE IS DAMAGED,  KEEP DRY,  KEEP AWAY FROM SUNLIGHT,  PROTECT FROM HEAT AND RADIOACTIVE SOURCES.]: Brand: PNEUMOSURE

## (undated) DEVICE — 40585 XL ADVANCED TRENDELENBURG POSITIONING KIT: Brand: 40585 XL ADVANCED TRENDELENBURG POSITIONING KIT

## (undated) DEVICE — SOLUTION IRRIG 3000ML 0.9% SOD CHL FLX CONT 0797208] ICU MEDICAL INC]

## (undated) DEVICE — CARDINAL HEALTH FLEXIBLE LIGHT HANDLE COVER: Brand: CARDINAL HEALTH

## (undated) DEVICE — LAPAROSCOPIC TROCAR SLEEVE/SINGLE USE: Brand: KII® OPTICAL ACCESS SYSTEM

## (undated) DEVICE — GAUZE,PACKING STRIP,IODOFORM,2"X5YD,STRL: Brand: CURAD

## (undated) DEVICE — (D)PREP SKN CHLRAPRP APPL 26ML -- CONVERT TO ITEM 371833

## (undated) DEVICE — SUTURE COAT VCRL SZ 4-0 L18IN ABSRB UD L19MM PS-2 1/2 CIR J496G

## (undated) DEVICE — Device

## (undated) DEVICE — BUTTON SWITCH PENCIL BLADE ELECTRODE, HOLSTER: Brand: EDGE

## (undated) DEVICE — DRAPE TWL SURG 16X26IN BLU ORB04] ALLCARE INC]

## (undated) DEVICE — 2, DISPOSABLE SUCTION/IRRIGATOR WITHOUT DISPOSABLE TIP: Brand: STRYKEFLOW

## (undated) DEVICE — APPLICATOR BNDG 1MM ADH PREMIERPRO EXOFIN

## (undated) DEVICE — SUTURE ABSORBABLE BRAIDED 0 CT-1 8X27 IN UD VICRYL JJ41G

## (undated) DEVICE — TRAY PREP DRY W/ PREM GLV 2 APPL 6 SPNG 2 UNDPD 1 OVERWRAP

## (undated) DEVICE — GARMENT,MEDLINE,DVT,INT,CALF,MED, GEN2: Brand: MEDLINE

## (undated) DEVICE — REM POLYHESIVE ADULT PATIENT RETURN ELECTRODE: Brand: VALLEYLAB

## (undated) DEVICE — DRAPE SHT 3 QTR PROXIMA 53X77 --

## (undated) DEVICE — TRAY CATH 16F DRN BG LTX -- CONVERT TO ITEM 363158

## (undated) DEVICE — SUT CHRMC 0 27IN CT1 BRN --

## (undated) DEVICE — SOLUTION IV 1000ML 0.9% SOD CHL